# Patient Record
Sex: FEMALE | Race: WHITE | NOT HISPANIC OR LATINO | Employment: OTHER | URBAN - NONMETROPOLITAN AREA
[De-identification: names, ages, dates, MRNs, and addresses within clinical notes are randomized per-mention and may not be internally consistent; named-entity substitution may affect disease eponyms.]

---

## 2024-01-26 PROBLEM — I27.20 PULMONARY HYPERTENSION (MULTI): Status: ACTIVE | Noted: 2024-01-26

## 2024-01-26 PROBLEM — R06.02 SHORTNESS OF BREATH: Status: ACTIVE | Noted: 2024-01-26

## 2024-01-26 PROBLEM — R00.1 SINUS BRADYCARDIA: Status: ACTIVE | Noted: 2024-01-26

## 2024-01-26 PROBLEM — I10 ESSENTIAL HYPERTENSION: Status: ACTIVE | Noted: 2024-01-26

## 2024-01-26 PROBLEM — Q21.0 VENTRICULAR SEPTAL DEFECT (HHS-HCC): Status: ACTIVE | Noted: 2024-01-26

## 2024-01-26 PROBLEM — I48.19 PERSISTENT ATRIAL FIBRILLATION (MULTI): Status: ACTIVE | Noted: 2024-01-26

## 2024-01-26 PROBLEM — K74.60 LIVER CIRRHOSIS (MULTI): Status: ACTIVE | Noted: 2024-01-26

## 2024-01-26 PROBLEM — E78.5 HYPERLIPIDEMIA: Status: ACTIVE | Noted: 2024-01-26

## 2024-01-26 RX ORDER — CALCIUM CARBONATE 300MG(750)
400 TABLET,CHEWABLE ORAL DAILY
COMMUNITY

## 2024-01-26 RX ORDER — ATORVASTATIN CALCIUM 10 MG/1
10 TABLET, FILM COATED ORAL DAILY
COMMUNITY
Start: 2023-04-10

## 2024-01-26 RX ORDER — OXYCODONE AND ACETAMINOPHEN 5; 325 MG/1; MG/1
1 TABLET ORAL DAILY PRN
COMMUNITY
End: 2024-03-20 | Stop reason: ALTCHOICE

## 2024-01-26 RX ORDER — SPIRONOLACTONE 100 MG/1
50 TABLET, FILM COATED ORAL DAILY
COMMUNITY
Start: 2021-09-01 | End: 2024-03-20 | Stop reason: DRUGHIGH

## 2024-01-26 RX ORDER — LANOLIN ALCOHOL/MO/W.PET/CERES
1000 CREAM (GRAM) TOPICAL DAILY
COMMUNITY

## 2024-01-26 RX ORDER — CALCIUM CARBONATE/VITAMIN D3 600MG-5MCG
2 TABLET ORAL DAILY
COMMUNITY

## 2024-01-26 RX ORDER — FERROUS SULFATE 325(65) MG
325 TABLET ORAL
COMMUNITY

## 2024-01-26 RX ORDER — METOPROLOL TARTRATE 25 MG/1
1 TABLET, FILM COATED ORAL 2 TIMES DAILY
COMMUNITY
Start: 2021-03-31 | End: 2024-04-15

## 2024-01-26 RX ORDER — ASPIRIN 81 MG/1
1 TABLET ORAL DAILY
COMMUNITY
Start: 2021-03-31

## 2024-01-26 RX ORDER — SERTRALINE HYDROCHLORIDE 50 MG/1
1 TABLET, FILM COATED ORAL DAILY
COMMUNITY
Start: 2021-10-17

## 2024-01-26 RX ORDER — VITAMIN E MIXED 400 UNIT
200 CAPSULE ORAL DAILY
COMMUNITY

## 2024-01-26 RX ORDER — MULTIVIT-MIN/IRON/FOLIC ACID/K 18-600-40
1 CAPSULE ORAL DAILY
COMMUNITY

## 2024-03-20 ENCOUNTER — OFFICE VISIT (OUTPATIENT)
Dept: CARDIOLOGY | Facility: CLINIC | Age: 84
End: 2024-03-20
Payer: MEDICARE

## 2024-03-20 VITALS
HEART RATE: 67 BPM | SYSTOLIC BLOOD PRESSURE: 136 MMHG | WEIGHT: 159 LBS | DIASTOLIC BLOOD PRESSURE: 84 MMHG | HEIGHT: 66 IN | BODY MASS INDEX: 25.55 KG/M2

## 2024-03-20 DIAGNOSIS — Z87.891 FORMER SMOKER: ICD-10-CM

## 2024-03-20 DIAGNOSIS — E78.2 MIXED HYPERLIPIDEMIA: ICD-10-CM

## 2024-03-20 DIAGNOSIS — K74.60 HEPATIC CIRRHOSIS, UNSPECIFIED HEPATIC CIRRHOSIS TYPE, UNSPECIFIED WHETHER ASCITES PRESENT (MULTI): ICD-10-CM

## 2024-03-20 DIAGNOSIS — Q21.0 VENTRICULAR SEPTAL DEFECT (HHS-HCC): ICD-10-CM

## 2024-03-20 DIAGNOSIS — I48.19 PERSISTENT ATRIAL FIBRILLATION (MULTI): Primary | ICD-10-CM

## 2024-03-20 DIAGNOSIS — I27.20 PULMONARY HYPERTENSION (MULTI): ICD-10-CM

## 2024-03-20 DIAGNOSIS — R00.1 SINUS BRADYCARDIA: ICD-10-CM

## 2024-03-20 DIAGNOSIS — I10 ESSENTIAL HYPERTENSION: ICD-10-CM

## 2024-03-20 PROCEDURE — 93000 ELECTROCARDIOGRAM COMPLETE: CPT | Performed by: INTERNAL MEDICINE

## 2024-03-20 PROCEDURE — 3075F SYST BP GE 130 - 139MM HG: CPT | Performed by: INTERNAL MEDICINE

## 2024-03-20 PROCEDURE — 99214 OFFICE O/P EST MOD 30 MIN: CPT | Performed by: INTERNAL MEDICINE

## 2024-03-20 PROCEDURE — 1160F RVW MEDS BY RX/DR IN RCRD: CPT | Performed by: INTERNAL MEDICINE

## 2024-03-20 PROCEDURE — 1159F MED LIST DOCD IN RCRD: CPT | Performed by: INTERNAL MEDICINE

## 2024-03-20 PROCEDURE — 3079F DIAST BP 80-89 MM HG: CPT | Performed by: INTERNAL MEDICINE

## 2024-03-20 RX ORDER — ASCORBIC ACID 1000 MG
175 TABLET ORAL DAILY
COMMUNITY

## 2024-03-20 RX ORDER — HYDROCODONE BITARTRATE AND ACETAMINOPHEN 5; 325 MG/1; MG/1
1 TABLET ORAL 2 TIMES DAILY
COMMUNITY

## 2024-03-20 RX ORDER — SPIRONOLACTONE 50 MG/1
50 TABLET, FILM COATED ORAL DAILY
COMMUNITY

## 2024-03-20 NOTE — LETTER
March 20, 2024     Francesco Aguila MD  [address]  [facility] [id number]    Patient: Billie Hernández   YOB: 1940   Date of Visit: 3/20/2024       Dear Dr. Francesco Aguila MD:    Thank you for referring Billie Hernández to me for evaluation. Below are my notes for this consultation.  If you have questions, please do not hesitate to call me. I look forward to following your patient along with you.       Sincerely,     Brad Toth MD      CC: No Recipients  ______________________________________________________________________________________    Subjective   Billie Hernández is a 83 y.o. female       Chief Complaint    Follow-up          HPI       Patient is here for follow-up continue management for persistent atrial fibrillation with several workups in the past last time was few years back, history of liver cirrhosis, intolerance to anticoagulation and known small ventricular septal defect.  Since last time I saw her she reports stable cardiac status.  She denies complaint of chest pain, palpitation, lightheadedness, dizziness or syncope.  She remained in normal sinus rhythm.  There has been no change in cardiac status or symptoms.  She was referred to Dr. Khan in the past but decision was made to defer Watchman device  ASSESSMENT:      1. Persistent atrial fibrillation with prior recurrence following previous left shoulder surgery was placed back on amiodarone at the [facility]. Currently in sinus rhythm. we stopped amiodarone few years back because of concern about liver cirrhosis and liver disease. She remains in sinus rhythm but she has not had any recurrence  2. liver cirrhosis. With esophageal greases and GI bleed  3. Intolerance to anticoagulation due to GI bleed was evaluated recently for watchman device decision was made to defer for the time being Dr. Khan  4. Known small ventriculoseptal defect. Change based on recent echo  5. Obesity. With minor weight gain but no edema  6.  "Former smoker.  7. Hypertension. Controlled  8.Anemia requiring repeat blood transfusion unable to tolerate anticoagulation. Last check appears to be stable  9. Hyperlipidemia does not meet the criteria for treatment  10. Volume overload due to liver cirrhosis improved back to her baseline. She followed by the renal service     RECOMMENDATION:      1. I reviewed treatment option with the patient treatment option at great length. I recommended continue with observant approach. If she had any recurrence we will consider placing her on sotalol or Tikosyn  2. The chances of recurrence of her atrial fibrillation were discussed.  3. I reviewed treatment option and anticoagulation. Patient was evaluated for watchman device and decision was made to defer for now  4.. The complexity of her cardiac status discussed with her at length she understood and agreed  5. I reviewed with her her recent lab work, and echocardiogram  6. I will see her back in the office in 6 months with an EKG     Review of Systems   All other systems reviewed and are negative.           Vitals:    03/20/24 1411   BP: 136/84   BP Location: Left arm   Patient Position: Sitting   Pulse: 67   Weight: 72.1 kg (159 lb)   Height: 1.676 m (5' 6\")        EKG done in office today     Objective   Physical Exam  Constitutional:       Appearance: Normal appearance.   HENT:      Nose: Nose normal.   Neck:      Vascular: No carotid bruit.   Cardiovascular:      Rate and Rhythm: Normal rate.      Pulses: Normal pulses.      Heart sounds: Normal heart sounds.   Pulmonary:      Effort: Pulmonary effort is normal.   Abdominal:      General: Bowel sounds are normal.      Palpations: Abdomen is soft.   Musculoskeletal:         General: Normal range of motion.      Cervical back: Normal range of motion.      Right lower leg: No edema.      Left lower leg: No edema.   Skin:     General: Skin is warm and dry.   Neurological:      General: No focal deficit present.      Mental " Status: She is alert.   Psychiatric:         Mood and Affect: Mood normal.         Behavior: Behavior normal.         Thought Content: Thought content normal.         Judgment: Judgment normal.         Allergies  Sulfamethoxazole-trimethoprim     Current Medications    Current Outpatient Medications:   •  ascorbic acid, vitamin C, 500 mg capsule, Take 1 capsule by mouth once daily., Disp: , Rfl:   •  aspirin 81 mg EC tablet, Take 1 tablet (81 mg) by mouth once daily., Disp: , Rfl:   •  atorvastatin (Lipitor) 10 mg tablet, Take 1 tablet (10 mg) by mouth once daily., Disp: , Rfl:   •  calcium carbonate-vitamin D3 600 mg-5 mcg (200 unit) tablet, Take 2 tablets by mouth once daily., Disp: , Rfl:   •  cyanocobalamin (Vitamin B-12) 1,000 mcg tablet, Take 1 tablet (1,000 mcg) by mouth once daily., Disp: , Rfl:   •  ferrous sulfate, 325 mg ferrous sulfate, (iron) tablet, Take 1 tablet by mouth once daily with breakfast., Disp: , Rfl:   •  HYDROcodone-acetaminophen (Norco) 5-325 mg tablet, Take 1 tablet by mouth 2 times a day., Disp: , Rfl:   •  magnesium oxide (Mag-Ox) 400 mg tablet, Take 1 tablet (400 mg) by mouth once daily., Disp: , Rfl:   •  metoprolol tartrate (Lopressor) 25 mg tablet, Take 1 tablet (25 mg) by mouth 2 times a day., Disp: , Rfl:   •  milk thistle 175 mg tablet, Take 1 tablet (175 mg) by mouth once daily., Disp: , Rfl:   •  sertraline (Zoloft) 50 mg tablet, Take 1 tablet (50 mg) by mouth once daily., Disp: , Rfl:   •  spironolactone (Aldactone) 50 mg tablet, Take 1 tablet (50 mg) by mouth once daily., Disp: , Rfl:   •  vitamin E 90 mg (200 unit) capsule, Take 1 capsule (200 Units) by mouth once daily., Disp: , Rfl:                      Assessment/Plan   1. Persistent atrial fibrillation (CMS/HCC)  Follow Up In Cardiology    ECG 12 Lead      2. Essential hypertension        3. Mixed hyperlipidemia        4. Sinus bradycardia        5. Pulmonary hypertension (CMS/HCC)        6. Ventricular septal defect         7. Hepatic cirrhosis, unspecified hepatic cirrhosis type, unspecified whether ascites present (CMS/HCC)        8. BMI 25.0-25.9,adult        9. Former smoker                 Scribe Attestation  By signing my name below, I, Kadeem Villanueva LPN   attest that this documentation has been prepared under the direction and in the presence of Brad Toth MD.     Provider Attestation - Scribe documentation    All medical record entries made by the Scribe were at my direction and personally dictated by me. I have reviewed the chart and agree that the record accurately reflects my personal performance of the history, physical exam, discussion and plan.

## 2024-03-20 NOTE — PROGRESS NOTES
Subjective   Billie Hernández is a 83 y.o. female       Chief Complaint    Follow-up          HPI       Patient is here for follow-up continue management for persistent atrial fibrillation with several workups in the past last time was few years back, history of liver cirrhosis, intolerance to anticoagulation and known small ventricular septal defect.  Since last time I saw her she reports stable cardiac status.  She denies complaint of chest pain, palpitation, lightheadedness, dizziness or syncope.  She remained in normal sinus rhythm.  There has been no change in cardiac status or symptoms.  She was referred to Dr. Khan in the past but decision was made to defer Watchman device  ASSESSMENT:      1. Persistent atrial fibrillation with prior recurrence following previous left shoulder surgery was placed back on amiodarone at the [facility]. Currently in sinus rhythm. we stopped amiodarone few years back because of concern about liver cirrhosis and liver disease. She remains in sinus rhythm but she has not had any recurrence  2. liver cirrhosis. With esophageal greases and GI bleed  3. Intolerance to anticoagulation due to GI bleed was evaluated recently for watchman device decision was made to defer for the time being Dr. Khan  4. Known small ventriculoseptal defect. Change based on recent echo  5. Obesity. With minor weight gain but no edema  6. Former smoker.  7. Hypertension. Controlled  8.Anemia requiring repeat blood transfusion unable to tolerate anticoagulation. Last check appears to be stable  9. Hyperlipidemia does not meet the criteria for treatment  10. Volume overload due to liver cirrhosis improved back to her baseline. She followed by the renal service     RECOMMENDATION:      1. I reviewed treatment option with the patient treatment option at great length. I recommended continue with observant approach. If she had any recurrence we will consider placing her on sotalol or Tikosyn  2. The chances of  "recurrence of her atrial fibrillation were discussed.  3. I reviewed treatment option and anticoagulation. Patient was evaluated for watchman device and decision was made to defer for now  4.. The complexity of her cardiac status discussed with her at length she understood and agreed  5. I reviewed with her her recent lab work, and echocardiogram  6. I will see her back in the office in 6 months with an EKG     Review of Systems   All other systems reviewed and are negative.           Vitals:    03/20/24 1411   BP: 136/84   BP Location: Left arm   Patient Position: Sitting   Pulse: 67   Weight: 72.1 kg (159 lb)   Height: 1.676 m (5' 6\")        EKG done in office today     Objective   Physical Exam  Constitutional:       Appearance: Normal appearance.   HENT:      Nose: Nose normal.   Neck:      Vascular: No carotid bruit.   Cardiovascular:      Rate and Rhythm: Normal rate.      Pulses: Normal pulses.      Heart sounds: Normal heart sounds.   Pulmonary:      Effort: Pulmonary effort is normal.   Abdominal:      General: Bowel sounds are normal.      Palpations: Abdomen is soft.   Musculoskeletal:         General: Normal range of motion.      Cervical back: Normal range of motion.      Right lower leg: No edema.      Left lower leg: No edema.   Skin:     General: Skin is warm and dry.   Neurological:      General: No focal deficit present.      Mental Status: She is alert.   Psychiatric:         Mood and Affect: Mood normal.         Behavior: Behavior normal.         Thought Content: Thought content normal.         Judgment: Judgment normal.         Allergies  Sulfamethoxazole-trimethoprim     Current Medications    Current Outpatient Medications:     ascorbic acid, vitamin C, 500 mg capsule, Take 1 capsule by mouth once daily., Disp: , Rfl:     aspirin 81 mg EC tablet, Take 1 tablet (81 mg) by mouth once daily., Disp: , Rfl:     atorvastatin (Lipitor) 10 mg tablet, Take 1 tablet (10 mg) by mouth once daily., Disp: , " Rfl:     calcium carbonate-vitamin D3 600 mg-5 mcg (200 unit) tablet, Take 2 tablets by mouth once daily., Disp: , Rfl:     cyanocobalamin (Vitamin B-12) 1,000 mcg tablet, Take 1 tablet (1,000 mcg) by mouth once daily., Disp: , Rfl:     ferrous sulfate, 325 mg ferrous sulfate, (iron) tablet, Take 1 tablet by mouth once daily with breakfast., Disp: , Rfl:     HYDROcodone-acetaminophen (Norco) 5-325 mg tablet, Take 1 tablet by mouth 2 times a day., Disp: , Rfl:     magnesium oxide (Mag-Ox) 400 mg tablet, Take 1 tablet (400 mg) by mouth once daily., Disp: , Rfl:     metoprolol tartrate (Lopressor) 25 mg tablet, Take 1 tablet (25 mg) by mouth 2 times a day., Disp: , Rfl:     milk thistle 175 mg tablet, Take 1 tablet (175 mg) by mouth once daily., Disp: , Rfl:     sertraline (Zoloft) 50 mg tablet, Take 1 tablet (50 mg) by mouth once daily., Disp: , Rfl:     spironolactone (Aldactone) 50 mg tablet, Take 1 tablet (50 mg) by mouth once daily., Disp: , Rfl:     vitamin E 90 mg (200 unit) capsule, Take 1 capsule (200 Units) by mouth once daily., Disp: , Rfl:                      Assessment/Plan   1. Persistent atrial fibrillation (CMS/HCC)  Follow Up In Cardiology    ECG 12 Lead      2. Essential hypertension        3. Mixed hyperlipidemia        4. Sinus bradycardia        5. Pulmonary hypertension (CMS/HCC)        6. Ventricular septal defect        7. Hepatic cirrhosis, unspecified hepatic cirrhosis type, unspecified whether ascites present (CMS/HCC)        8. BMI 25.0-25.9,adult        9. Former smoker                 Scribe Attestation  By signing my name below, I, Kadeem Villanueva LPN   attest that this documentation has been prepared under the direction and in the presence of Brad Toth MD.     Provider Attestation - Scribe documentation    All medical record entries made by the Scribe were at my direction and personally dictated by me. I have reviewed the chart and agree that the record accurately  reflects my personal performance of the history, physical exam, discussion and plan.

## 2024-03-20 NOTE — PATIENT INSTRUCTIONS
Please bring all medicines, vitamins, and herbal supplements with you when you come to the office.    Prescriptions will not be filled unless you are compliant with your follow up appointments or have a follow up appointment scheduled as per instruction of your physician. Refills should be requested at the time of your visit.    BMI was above normal measurement. Current weight: 72.1 kg (159 lb)  Weight change since last visit (-) denotes wt loss -6 lbs   Weight loss needed to achieve BMI 25: 4.4 Lbs  Weight loss needed to achieve BMI 30: -26.5 Lbs  Provided instructions on dietary changes  Provided instructions on exercise.

## 2024-04-15 DIAGNOSIS — I48.19 PERSISTENT ATRIAL FIBRILLATION (MULTI): ICD-10-CM

## 2024-04-15 DIAGNOSIS — I10 ESSENTIAL HYPERTENSION: ICD-10-CM

## 2024-04-15 RX ORDER — METOPROLOL TARTRATE 25 MG/1
25 TABLET, FILM COATED ORAL 2 TIMES DAILY
Qty: 180 TABLET | Refills: 3 | Status: SHIPPED | OUTPATIENT
Start: 2024-04-15

## 2024-06-07 ENCOUNTER — TELEPHONE (OUTPATIENT)
Dept: CARDIOLOGY | Facility: CLINIC | Age: 84
End: 2024-06-07
Payer: MEDICARE

## 2024-06-07 NOTE — TELEPHONE ENCOUNTER
----- Message from Brad Toth MD sent at 6/6/2024  5:05 PM EDT -----  Overall patient is acceptable to proceed with surgery.  She is at increased risk for atrial fibrillation but this is not prohibitive.  Patient can proceed  ----- Message -----  From: Madeline Micha  Sent: 6/6/2024   3:41 PM EDT  To: MD Brad Morelos MD    [place] [id number] Clinical Support Staff  Patient overall is acceptable surgical risk.  She can proceed.  She is at increased risk of atrial fibrillation.  Okay to hold aspirin for 5 5 to 7 days prior to procedure

## 2024-06-14 ENCOUNTER — TELEPHONE (OUTPATIENT)
Dept: CARDIOLOGY | Facility: CLINIC | Age: 84
End: 2024-06-14

## 2024-06-14 DIAGNOSIS — I48.91 ATRIAL FIBRILLATION, UNSPECIFIED TYPE (MULTI): ICD-10-CM

## 2024-06-14 NOTE — TELEPHONE ENCOUNTER
Patient was seen on consult at [facility] on 6/12/2024; Reason for Consult:   Atrial fibrillation with a rapid ventricular response. At discharge need 1 week EKG for QTC prolongation and then follow up in 2 months. Patient started on Tikoysn 125mcg twice daily.

## 2024-06-21 ENCOUNTER — ANCILLARY PROCEDURE (OUTPATIENT)
Dept: CARDIOLOGY | Facility: CLINIC | Age: 84
End: 2024-06-21
Payer: MEDICARE

## 2024-06-21 VITALS
WEIGHT: 146.6 LBS | HEART RATE: 61 BPM | BODY MASS INDEX: 23.56 KG/M2 | HEIGHT: 66 IN | DIASTOLIC BLOOD PRESSURE: 62 MMHG | SYSTOLIC BLOOD PRESSURE: 106 MMHG

## 2024-06-21 RX ORDER — DOFETILIDE 0.12 MG/1
125 CAPSULE ORAL EVERY 12 HOURS
COMMUNITY

## 2024-06-21 NOTE — PROGRESS NOTES
"Patient is here for an EKG visit ordered by Dr. Toth due to atrial fibrillation. Dr. Jo is in suite. Patient is here due to initiation of Tikosyn 125mg BID and prolonged QT interval. Medication list Updated verbally with patient in office. Denies any current cardiac complaints. Discussed with Krystle Noriega RN prior to discharge.     To Dr. Toth for review.         Vitals:    06/21/24 1403   BP: 106/62   BP Location: Left arm   Patient Position: Sitting   Pulse: 61   Weight: 66.5 kg (146 lb 9.6 oz)   Height: 1.676 m (5' 6\")       "

## 2024-07-08 DIAGNOSIS — I48.19 PERSISTENT ATRIAL FIBRILLATION (MULTI): ICD-10-CM

## 2024-07-08 RX ORDER — DOFETILIDE 0.12 MG/1
125 CAPSULE ORAL EVERY 12 HOURS
Qty: 180 CAPSULE | Refills: 3 | Status: SHIPPED | OUTPATIENT
Start: 2024-07-08 | End: 2025-07-08

## 2024-07-15 PROCEDURE — 93306 TTE W/DOPPLER COMPLETE: CPT | Performed by: INTERNAL MEDICINE

## 2024-09-25 ENCOUNTER — APPOINTMENT (OUTPATIENT)
Dept: CARDIOLOGY | Facility: CLINIC | Age: 84
End: 2024-09-25
Payer: MEDICARE

## 2024-09-25 VITALS
SYSTOLIC BLOOD PRESSURE: 104 MMHG | BODY MASS INDEX: 22.5 KG/M2 | WEIGHT: 140 LBS | DIASTOLIC BLOOD PRESSURE: 60 MMHG | HEIGHT: 66 IN | HEART RATE: 76 BPM

## 2024-09-25 DIAGNOSIS — E78.2 MIXED HYPERLIPIDEMIA: ICD-10-CM

## 2024-09-25 DIAGNOSIS — K74.60 HEPATIC CIRRHOSIS, UNSPECIFIED HEPATIC CIRRHOSIS TYPE, UNSPECIFIED WHETHER ASCITES PRESENT (MULTI): ICD-10-CM

## 2024-09-25 DIAGNOSIS — R06.02 SHORTNESS OF BREATH: ICD-10-CM

## 2024-09-25 DIAGNOSIS — I48.91 ATRIAL FIBRILLATION, UNSPECIFIED TYPE (MULTI): Primary | ICD-10-CM

## 2024-09-25 DIAGNOSIS — I48.19 PERSISTENT ATRIAL FIBRILLATION (MULTI): ICD-10-CM

## 2024-09-25 DIAGNOSIS — R00.1 SINUS BRADYCARDIA: ICD-10-CM

## 2024-09-25 DIAGNOSIS — I10 ESSENTIAL HYPERTENSION: ICD-10-CM

## 2024-09-25 DIAGNOSIS — I27.20 PULMONARY HYPERTENSION (MULTI): ICD-10-CM

## 2024-09-25 PROCEDURE — 1036F TOBACCO NON-USER: CPT | Performed by: INTERNAL MEDICINE

## 2024-09-25 PROCEDURE — 1160F RVW MEDS BY RX/DR IN RCRD: CPT | Performed by: INTERNAL MEDICINE

## 2024-09-25 PROCEDURE — 1159F MED LIST DOCD IN RCRD: CPT | Performed by: INTERNAL MEDICINE

## 2024-09-25 PROCEDURE — 3074F SYST BP LT 130 MM HG: CPT | Performed by: INTERNAL MEDICINE

## 2024-09-25 PROCEDURE — 3078F DIAST BP <80 MM HG: CPT | Performed by: INTERNAL MEDICINE

## 2024-09-25 PROCEDURE — 99214 OFFICE O/P EST MOD 30 MIN: CPT | Performed by: INTERNAL MEDICINE

## 2024-09-25 PROCEDURE — 93000 ELECTROCARDIOGRAM COMPLETE: CPT | Performed by: INTERNAL MEDICINE

## 2024-09-25 RX ORDER — FOLIC ACID 1 MG/1
1 TABLET ORAL
COMMUNITY
Start: 2024-06-11

## 2024-09-25 RX ORDER — DILTIAZEM HYDROCHLORIDE 120 MG/1
120 CAPSULE, COATED, EXTENDED RELEASE ORAL DAILY
Qty: 90 CAPSULE | Refills: 3 | Status: SHIPPED | OUTPATIENT
Start: 2024-09-25 | End: 2025-09-25

## 2024-09-25 RX ORDER — DILTIAZEM HYDROCHLORIDE 120 MG/1
120 CAPSULE, COATED, EXTENDED RELEASE ORAL DAILY
COMMUNITY
Start: 2024-09-03 | End: 2024-09-25 | Stop reason: SDUPTHER

## 2024-09-25 RX ORDER — NYSTATIN 100000 [USP'U]/ML
5 SUSPENSION ORAL 4 TIMES DAILY
COMMUNITY
Start: 2024-08-16

## 2024-09-25 RX ORDER — PSYLLIUM HUSK 0.4 G
1 CAPSULE ORAL DAILY
COMMUNITY

## 2024-09-25 RX ORDER — PANTOPRAZOLE SODIUM 40 MG/1
40 TABLET, DELAYED RELEASE ORAL DAILY
COMMUNITY
Start: 2024-07-17

## 2024-09-25 RX ORDER — CHOLECALCIFEROL (VITAMIN D3) 125 MCG
5000 CAPSULE ORAL
COMMUNITY

## 2024-09-25 RX ORDER — DOXYCYCLINE HYCLATE 100 MG/1
100 TABLET, DELAYED RELEASE ORAL 2 TIMES DAILY
COMMUNITY

## 2024-09-25 RX ORDER — ACETAMINOPHEN 325 MG/1
325 TABLET ORAL EVERY 6 HOURS PRN
COMMUNITY
Start: 2024-07-16

## 2024-09-25 RX ORDER — DOFETILIDE 0.12 MG/1
125 CAPSULE ORAL EVERY 12 HOURS
Qty: 180 CAPSULE | Refills: 3 | Status: SHIPPED | OUTPATIENT
Start: 2024-09-25 | End: 2025-09-25

## 2024-09-25 NOTE — PROGRESS NOTES
Subjective   Billie Hernández is a 84 y.o. female       Chief Complaint    Follow-up          HPI        Patient is here for follow-up continue management for persistent atrial fibrillation.  She was recently in the hospital and was placed on dofetilide.  Since then she reported improvement of her symptoms.  She denies chest pain, lightheadedness, dizziness or syncope.  I saw her in the hospital on 2 occasions for her A-fib and for volume management.  She did undergo recent lab all of it with noted and reviewed with her.    ASSESSMENT:      1. Persistent atrial fibrillation with prior recurrence following previous left shoulder surgery was placed back on amiodarone at the [facility]. Currently in sinus rhythm. we stopped amiodarone few years back because of concern about liver cirrhosis and liver disease. Sshe had recent recurrence and was placed on dofetilide currently in sinus rhythm.  Patient not candidate for anticoagulation due to liver cirrhosis  2. liver cirrhosis. With esophageal greases and GI bleed  3. Intolerance to anticoagulation due to GI bleed was evaluated recently for watchman device decision was made to defer for the time being Dr. Khan  4. Known small ventriculoseptal defect. Change based on recent echo  5. Obesity. With minor weight gain but no edema  6. Former smoker.  7. Hypertension. Controlled  8.Anemia requiring repeat blood transfusion unable to tolerate anticoagulation. Last check appears to be stable  9. Hyperlipidemia does not meet the criteria for treatment  10. Volume overload due to liver cirrhosis improved back to her baseline. She followed by the renal service     RECOMMENDATION:      1. I advised the patient to continue present medical regimen  2. The chances of recurrence of her atrial fibrillation were discussed.  And treatment option reviewed with her and her daughter  3. I reviewed treatment option and anticoagulation. Patient was evaluated for watchman device and decision  "was made to defer for now  4.. The complexity of her cardiac status discussed with her at length she understood and agreed  5. I reviewed with her her recent lab work, and hospitalization record.  Her potassium and sodium are normal  6. I will see her back in the office in 6 months with an EKG     Review of Systems   Constitutional: Positive for malaise/fatigue.   All other systems reviewed and are negative.           Vitals:    09/25/24 1429   BP: 104/60   BP Location: Right arm   Patient Position: Sitting   Pulse: 76   Weight: 63.5 kg (140 lb)   Height: 1.676 m (5' 6\")      EKG done in office today      Objective   Physical Exam  Constitutional:       Appearance: Normal appearance.   HENT:      Nose: Nose normal.   Neck:      Vascular: No carotid bruit.   Cardiovascular:      Rate and Rhythm: Normal rate.      Pulses: Normal pulses.      Heart sounds: Normal heart sounds.   Pulmonary:      Effort: Pulmonary effort is normal.   Abdominal:      General: Bowel sounds are normal.      Palpations: Abdomen is soft.   Musculoskeletal:         General: Normal range of motion.      Cervical back: Normal range of motion.      Right lower leg: No edema.      Left lower leg: No edema.   Skin:     General: Skin is warm and dry.   Neurological:      General: No focal deficit present.      Mental Status: She is alert.   Psychiatric:         Mood and Affect: Mood normal.         Behavior: Behavior normal.         Thought Content: Thought content normal.         Judgment: Judgment normal.         Allergies  Sulfamethoxazole-trimethoprim     Current Medications    Current Outpatient Medications:     acetaminophen (Tylenol) 325 mg tablet, Take 1 tablet (325 mg) by mouth every 6 hours if needed., Disp: , Rfl:     ascorbic acid, vitamin C, 500 mg capsule, Take 2 capsules by mouth once daily., Disp: , Rfl:     aspirin 81 mg EC tablet, Take 1 tablet (81 mg) by mouth 2 times a day., Disp: , Rfl:     atorvastatin (Lipitor) 10 mg tablet, " Take 1 tablet (10 mg) by mouth once daily., Disp: , Rfl:     calcium carbonate-vitamin D3 600 mg-5 mcg (200 unit) tablet, Take 2 tablets by mouth once daily., Disp: , Rfl:     cholecalciferol (Vitamin D-3) 125 MCG (5000 UT) capsule, Take 1 capsule (125 mcg) by mouth once daily., Disp: , Rfl:     cyanocobalamin (Vitamin B-12) 1,000 mcg tablet, Take 1 tablet (1,000 mcg) by mouth once daily., Disp: , Rfl:     dilTIAZem CD (Cardizem CD) 120 mg 24 hr capsule, 1 capsule (120 mg) once daily., Disp: , Rfl:     dofetilide (Tikosyn) 125 mcg capsule, Take 1 capsule (125 mcg) by mouth every 12 hours., Disp: 180 capsule, Rfl: 3    doxycycline (Doryx) 100 mg EC tablet, Take 1 tablet (100 mg) by mouth twice a day., Disp: , Rfl:     ferrous sulfate, 325 mg ferrous sulfate, (iron) tablet, Take 1 tablet (325 mg) by mouth once daily with breakfast., Disp: , Rfl:     folic acid (Folvite) 1 mg tablet, Take 1 tablet (1 mg) by mouth once daily., Disp: , Rfl:     HYDROcodone-acetaminophen (Norco) 5-325 mg tablet, Take 1 tablet by mouth 2 times a day., Disp: , Rfl:     magnesium oxide (Mag-Ox) 400 mg tablet, Take 1 tablet (400 mg) by mouth once daily., Disp: , Rfl:     nystatin (Mycostatin) 100,000 unit/mL suspension, 5 mL (500,000 Units) 4 times a day., Disp: , Rfl:     pantoprazole (ProtoNix) 40 mg EC tablet, Take 1 tablet (40 mg) by mouth early in the morning.., Disp: , Rfl:     psyllium (Metamucil) 0.4 gram capsule, Take 1 capsule by mouth early in the morning.., Disp: , Rfl:     sertraline (Zoloft) 50 mg tablet, Take 1 tablet (50 mg) by mouth once daily., Disp: , Rfl:     spironolactone (Aldactone) 50 mg tablet, Take 0.5 tablets (25 mg) by mouth once daily., Disp: , Rfl:     metoprolol tartrate (Lopressor) 25 mg tablet, TAKE 1 TABLET BY MOUTH TWICE  DAILY, Disp: 180 tablet, Rfl: 3                     Assessment/Plan   1. Atrial fibrillation, unspecified type (Multi)  Follow Up In Cardiology      2. Mixed hyperlipidemia        3. Sinus  bradycardia        4. Pulmonary hypertension (Multi)        5. Persistent atrial fibrillation (Multi)        6. Essential hypertension        7. BMI 25.0-25.9,adult        8. Hepatic cirrhosis, unspecified hepatic cirrhosis type, unspecified whether ascites present (Multi)        9. Shortness of breath                 Scribe Attestation  By signing my name below, Loretta RUST LPN, Scribe   attest that this documentation has been prepared under the direction and in the presence of Brad Toth MD.     Provider Attestation - Scribe documentation    All medical record entries made by the Scribe were at my direction and personally dictated by me. I have reviewed the chart and agree that the record accurately reflects my personal performance of the history, physical exam, discussion and plan.

## 2024-09-25 NOTE — PATIENT INSTRUCTIONS
Please bring all medicines, vitamins, and herbal supplements with you when you come to the office.    Prescriptions will not be filled unless you are compliant with your follow up appointments or have a follow up appointment scheduled as per instruction of your physician. Refills should be requested at the time of your visit.     Fall Prevention Education Given

## 2024-09-25 NOTE — LETTER
September 25, 2024     Francesco Aguila MD  [address]  [facility] [address]    Patient: Billie Hernández   YOB: 1940   Date of Visit: 9/25/2024       Dear Dr. Francesco Aguila MD:    Thank you for referring Billie Hernández to me for evaluation. Below are my notes for this consultation.  If you have questions, please do not hesitate to call me. I look forward to following your patient along with you.       Sincerely,     Brad Toth MD      CC: No Recipients  ______________________________________________________________________________________    Subjective   Billie Hernández is a 84 y.o. female       Chief Complaint    Follow-up          HPI        Patient is here for follow-up continue management for persistent atrial fibrillation.  She was recently in the hospital and was placed on dofetilide.  Since then she reported improvement of her symptoms.  She denies chest pain, lightheadedness, dizziness or syncope.  I saw her in the hospital on 2 occasions for her A-fib and for volume management.  She did undergo recent lab all of it with noted and reviewed with her.    ASSESSMENT:      1. Persistent atrial fibrillation with prior recurrence following previous left shoulder surgery was placed back on amiodarone at the [facility]. Currently in sinus rhythm. we stopped amiodarone few years back because of concern about liver cirrhosis and liver disease. Sshe had recent recurrence and was placed on dofetilide currently in sinus rhythm.  Patient not candidate for anticoagulation due to liver cirrhosis  2. liver cirrhosis. With esophageal greases and GI bleed  3. Intolerance to anticoagulation due to GI bleed was evaluated recently for watchman device decision was made to defer for the time being Dr. Khan  4. Known small ventriculoseptal defect. Change based on recent echo  5. Obesity. With minor weight gain but no edema  6. Former smoker.  7. Hypertension. Controlled  8.Anemia requiring repeat  "blood transfusion unable to tolerate anticoagulation. Last check appears to be stable  9. Hyperlipidemia does not meet the criteria for treatment  10. Volume overload due to liver cirrhosis improved back to her baseline. She followed by the renal service     RECOMMENDATION:      1. I advised the patient to continue present medical regimen  2. The chances of recurrence of her atrial fibrillation were discussed.  And treatment option reviewed with her and her daughter  3. I reviewed treatment option and anticoagulation. Patient was evaluated for watchman device and decision was made to defer for now  4.. The complexity of her cardiac status discussed with her at length she understood and agreed  5. I reviewed with her her recent lab work, and hospitalization record.  Her potassium and sodium are normal  6. I will see her back in the office in 6 months with an EKG     Review of Systems   Constitutional: Positive for malaise/fatigue.   All other systems reviewed and are negative.           Vitals:    09/25/24 1429   BP: 104/60   BP Location: Right arm   Patient Position: Sitting   Pulse: 76   Weight: 63.5 kg (140 lb)   Height: 1.676 m (5' 6\")      EKG done in office today      Objective   Physical Exam  Constitutional:       Appearance: Normal appearance.   HENT:      Nose: Nose normal.   Neck:      Vascular: No carotid bruit.   Cardiovascular:      Rate and Rhythm: Normal rate.      Pulses: Normal pulses.      Heart sounds: Normal heart sounds.   Pulmonary:      Effort: Pulmonary effort is normal.   Abdominal:      General: Bowel sounds are normal.      Palpations: Abdomen is soft.   Musculoskeletal:         General: Normal range of motion.      Cervical back: Normal range of motion.      Right lower leg: No edema.      Left lower leg: No edema.   Skin:     General: Skin is warm and dry.   Neurological:      General: No focal deficit present.      Mental Status: She is alert.   Psychiatric:         Mood and Affect: " Mood normal.         Behavior: Behavior normal.         Thought Content: Thought content normal.         Judgment: Judgment normal.         Allergies  Sulfamethoxazole-trimethoprim     Current Medications    Current Outpatient Medications:   •  acetaminophen (Tylenol) 325 mg tablet, Take 1 tablet (325 mg) by mouth every 6 hours if needed., Disp: , Rfl:   •  ascorbic acid, vitamin C, 500 mg capsule, Take 2 capsules by mouth once daily., Disp: , Rfl:   •  aspirin 81 mg EC tablet, Take 1 tablet (81 mg) by mouth 2 times a day., Disp: , Rfl:   •  atorvastatin (Lipitor) 10 mg tablet, Take 1 tablet (10 mg) by mouth once daily., Disp: , Rfl:   •  calcium carbonate-vitamin D3 600 mg-5 mcg (200 unit) tablet, Take 2 tablets by mouth once daily., Disp: , Rfl:   •  cholecalciferol (Vitamin D-3) 125 MCG (5000 UT) capsule, Take 1 capsule (125 mcg) by mouth once daily., Disp: , Rfl:   •  cyanocobalamin (Vitamin B-12) 1,000 mcg tablet, Take 1 tablet (1,000 mcg) by mouth once daily., Disp: , Rfl:   •  dilTIAZem CD (Cardizem CD) 120 mg 24 hr capsule, 1 capsule (120 mg) once daily., Disp: , Rfl:   •  dofetilide (Tikosyn) 125 mcg capsule, Take 1 capsule (125 mcg) by mouth every 12 hours., Disp: 180 capsule, Rfl: 3  •  doxycycline (Doryx) 100 mg EC tablet, Take 1 tablet (100 mg) by mouth twice a day., Disp: , Rfl:   •  ferrous sulfate, 325 mg ferrous sulfate, (iron) tablet, Take 1 tablet (325 mg) by mouth once daily with breakfast., Disp: , Rfl:   •  folic acid (Folvite) 1 mg tablet, Take 1 tablet (1 mg) by mouth once daily., Disp: , Rfl:   •  HYDROcodone-acetaminophen (Norco) 5-325 mg tablet, Take 1 tablet by mouth 2 times a day., Disp: , Rfl:   •  magnesium oxide (Mag-Ox) 400 mg tablet, Take 1 tablet (400 mg) by mouth once daily., Disp: , Rfl:   •  nystatin (Mycostatin) 100,000 unit/mL suspension, 5 mL (500,000 Units) 4 times a day., Disp: , Rfl:   •  pantoprazole (ProtoNix) 40 mg EC tablet, Take 1 tablet (40 mg) by mouth early in the  morning.., Disp: , Rfl:   •  psyllium (Metamucil) 0.4 gram capsule, Take 1 capsule by mouth early in the morning.., Disp: , Rfl:   •  sertraline (Zoloft) 50 mg tablet, Take 1 tablet (50 mg) by mouth once daily., Disp: , Rfl:   •  spironolactone (Aldactone) 50 mg tablet, Take 0.5 tablets (25 mg) by mouth once daily., Disp: , Rfl:   •  metoprolol tartrate (Lopressor) 25 mg tablet, TAKE 1 TABLET BY MOUTH TWICE  DAILY, Disp: 180 tablet, Rfl: 3                     Assessment/Plan   1. Atrial fibrillation, unspecified type (Multi)  Follow Up In Cardiology      2. Mixed hyperlipidemia        3. Sinus bradycardia        4. Pulmonary hypertension (Multi)        5. Persistent atrial fibrillation (Multi)        6. Essential hypertension        7. BMI 25.0-25.9,adult        8. Hepatic cirrhosis, unspecified hepatic cirrhosis type, unspecified whether ascites present (Multi)        9. Shortness of breath                 Scribe Attestation  By signing my name below, Loretta RUST LPN, Scribe   attest that this documentation has been prepared under the direction and in the presence of Brad Toth MD.     Provider Attestation - Scribe documentation    All medical record entries made by the Scribe were at my direction and personally dictated by me. I have reviewed the chart and agree that the record accurately reflects my personal performance of the history, physical exam, discussion and plan.

## 2024-10-08 DIAGNOSIS — I48.19 PERSISTENT ATRIAL FIBRILLATION (MULTI): ICD-10-CM

## 2024-10-08 NOTE — TELEPHONE ENCOUNTER
Patient called stating she is supposed to take three tablets of 125mcg of Tikosyn. I advised patient that from dc of hospital and recent OV he dosage is still 125mcg every 12 hours. Patient wants clarification directly from Dr. Brad Toth MD    To Dr. Brad Toth MD for review.

## 2024-10-08 NOTE — TELEPHONE ENCOUNTER
Patient phones back, informed her she should be taking 125 mcg of tikosyn bid. Patient states she is not taking 125 mcg bid she states she has been taking 375 mcg tikosyn bid as per the recommendation of Dr. Brad Toth MD when she was hospitalized she states Dr. Manuel Jo MD advised she take 4 pills and then Dr. Brad Toth MD recommended 3 pills.     Will check to see if there are further records from hospital following discharge on 7/22/24 per patient.

## 2024-10-09 RX ORDER — DOFETILIDE 0.12 MG/1
375 CAPSULE ORAL EVERY 12 HOURS
Qty: 540 CAPSULE | Refills: 3 | Status: SHIPPED | OUTPATIENT
Start: 2024-10-09 | End: 2025-10-09

## 2024-10-09 NOTE — TELEPHONE ENCOUNTER
[place] [facility] [place]. Records show patient was started on 375 mcg tikosyn bid and tikosyn 250 mcg bid was discontinued.     Please verify if patient should be taking 375 mcg tikosyn bid.

## 2024-10-09 NOTE — TELEPHONE ENCOUNTER
Patient updated on correct dosing of tikosyn. She verbalized understanding.     Patient requesting refill, order prepped and sent to Dr. Brad Toth MD for signature.

## 2025-02-14 DIAGNOSIS — I48.19 PERSISTENT ATRIAL FIBRILLATION (MULTI): ICD-10-CM

## 2025-02-14 RX ORDER — DOFETILIDE 0.12 MG/1
375 CAPSULE ORAL EVERY 12 HOURS
Qty: 15 CAPSULE | Refills: 0 | Status: CANCELLED | OUTPATIENT
Start: 2025-02-14 | End: 2025-02-17

## 2025-02-14 NOTE — TELEPHONE ENCOUNTER
Dr. Brad Toth MD patient, please sign short supply    If she is having difficulty breathing then her asthma is not well controlled and she needs a f/u appt. Please inform mom and schedule her for f/u to discuss asthma and PE

## 2025-03-26 ENCOUNTER — APPOINTMENT (OUTPATIENT)
Dept: CARDIOLOGY | Facility: CLINIC | Age: 85
End: 2025-03-26
Payer: MEDICARE

## 2025-03-26 VITALS
SYSTOLIC BLOOD PRESSURE: 134 MMHG | HEART RATE: 68 BPM | BODY MASS INDEX: 24.91 KG/M2 | DIASTOLIC BLOOD PRESSURE: 64 MMHG | WEIGHT: 155 LBS | HEIGHT: 66 IN

## 2025-03-26 DIAGNOSIS — Z87.891 FORMER SMOKER: ICD-10-CM

## 2025-03-26 DIAGNOSIS — K74.60 HEPATIC CIRRHOSIS, UNSPECIFIED HEPATIC CIRRHOSIS TYPE, UNSPECIFIED WHETHER ASCITES PRESENT (MULTI): ICD-10-CM

## 2025-03-26 DIAGNOSIS — I10 ESSENTIAL HYPERTENSION: ICD-10-CM

## 2025-03-26 DIAGNOSIS — E78.2 MIXED HYPERLIPIDEMIA: ICD-10-CM

## 2025-03-26 DIAGNOSIS — R00.1 SINUS BRADYCARDIA: ICD-10-CM

## 2025-03-26 DIAGNOSIS — I48.19 PERSISTENT ATRIAL FIBRILLATION (MULTI): Primary | ICD-10-CM

## 2025-03-26 DIAGNOSIS — Q21.0 VENTRICULAR SEPTAL DEFECT (HHS-HCC): ICD-10-CM

## 2025-03-26 PROCEDURE — 99214 OFFICE O/P EST MOD 30 MIN: CPT | Performed by: INTERNAL MEDICINE

## 2025-03-26 PROCEDURE — 93000 ELECTROCARDIOGRAM COMPLETE: CPT | Performed by: INTERNAL MEDICINE

## 2025-03-26 PROCEDURE — 1159F MED LIST DOCD IN RCRD: CPT | Performed by: INTERNAL MEDICINE

## 2025-03-26 PROCEDURE — G2211 COMPLEX E/M VISIT ADD ON: HCPCS | Performed by: INTERNAL MEDICINE

## 2025-03-26 PROCEDURE — 3075F SYST BP GE 130 - 139MM HG: CPT | Performed by: INTERNAL MEDICINE

## 2025-03-26 PROCEDURE — 1036F TOBACCO NON-USER: CPT | Performed by: INTERNAL MEDICINE

## 2025-03-26 PROCEDURE — 3078F DIAST BP <80 MM HG: CPT | Performed by: INTERNAL MEDICINE

## 2025-03-26 PROCEDURE — 1160F RVW MEDS BY RX/DR IN RCRD: CPT | Performed by: INTERNAL MEDICINE

## 2025-03-26 RX ORDER — FAMOTIDINE 10 MG/1
10 TABLET ORAL DAILY
COMMUNITY

## 2025-03-26 NOTE — PATIENT INSTRUCTIONS
Please bring all medicines, vitamins, and herbal supplements with you when you come to the office.    Prescriptions will not be filled unless you are compliant with your follow up appointments or have a follow up appointment scheduled as per instruction of your physician. Refills should be requested at the time of your visit.     Fall Prevention Education Given     BMI was above normal measurement. Current weight: 70.3 kg (155 lb)  Weight change since last visit (-) denotes wt loss 15 lbs   Weight loss needed to achieve BMI 25: 0.4 Lbs  Weight loss needed to achieve BMI 30: -30.5 Lbs  Provided instructions on dietary changes.    6 months  Same meds

## 2025-03-26 NOTE — PROGRESS NOTES
Chief Complaint   Patient presents with    Follow-up     Patient here for 6 month follow up for a-fib; states she was in a-fib 03.15.25, which lasted for about 10-15 minutes.        Subjective   Billie Hernández is a 84 y.o. female     HPI        Patient is here for follow-up to management for persistent atrial fibrillation maintaining sinus rhythm with dofetilide, history of liver cirrhosis and intolerance to anticoagulation and ventricular septal defect.  Since last time I saw her she reports 1 brief episode of atrial fibrillation lasted 15 minutes.  She denies chest pain, lightheadedness, dizziness or syncope.  Her recent lab work noted and reviewed with her.    ASSESSMENT:      1. Persistent atrial fibrillation with prior recurrence following previous left shoulder surgery was placed back on amiodarone at the [facility]. Currently in sinus rhythm. we stopped amiodarone few years back because of concern about liver cirrhosis and liver disease. Sshe had recent recurrence and was placed on dofetilide currently in sinus rhythm.  Patient not candidate for anticoagulation due to liver cirrhosis overall she had good control on her A-fib  2. liver cirrhosis. With esophageal greases and GI bleed  3. Intolerance to anticoagulation due to GI bleed was evaluated recently for watchman device decision was made to defer for the time being Dr. Khan  4. Known small ventriculoseptal defect. Change based on recent echo  5.  BMI down to 25  6. Former smoker.  7. Hypertension. Controlled  8.Anemia requiring repeat blood transfusion unable to tolerate anticoagulation. Last check appears to be stable  9. Hyperlipidemia does not meet the criteria for treatment  10. Volume overload due to liver cirrhosis improved back to her baseline. She followed by the renal service     RECOMMENDATION:      1. I advised the patient to continue present medical regimen  2. The chances of recurrence of her atrial fibrillation were discussed.  And  "treatment option reviewed with her and her daughter  3. I reviewed treatment option and anticoagulation. Patient was evaluated for watchman device and decision was made to defer for now  4.. The complexity of her cardiac status discussed with her at length she understood and agreed  5. I reviewed with her her recent lab work,  6. I will see her back in the office in 6 months with an EKG  Review of Systems   All other systems reviewed and are negative.       EKG done in office today      Vitals:    03/26/25 1443   BP: 134/64   BP Location: Left arm   Patient Position: Sitting   Pulse: 68   Weight: 70.3 kg (155 lb)   Height: 1.676 m (5' 6\")        Objective   Physical Exam  Constitutional:       Appearance: Normal appearance.   HENT:      Nose: Nose normal.   Neck:      Vascular: No carotid bruit.   Cardiovascular:      Rate and Rhythm: Normal rate.      Pulses: Normal pulses.      Heart sounds: Normal heart sounds.   Pulmonary:      Effort: Pulmonary effort is normal.   Abdominal:      General: Bowel sounds are normal.      Palpations: Abdomen is soft.   Musculoskeletal:         General: Normal range of motion.      Cervical back: Normal range of motion.      Right lower leg: No edema.      Left lower leg: No edema.   Skin:     General: Skin is warm and dry.   Neurological:      General: No focal deficit present.      Mental Status: She is alert.   Psychiatric:         Mood and Affect: Mood normal.         Behavior: Behavior normal.         Thought Content: Thought content normal.         Judgment: Judgment normal.         Allergies  Sulfamethoxazole-trimethoprim     Current Medications    Current Outpatient Medications:     acetaminophen (Tylenol) 325 mg tablet, Take 1 tablet (325 mg) by mouth every 6 hours if needed., Disp: , Rfl:     ascorbic acid, vitamin C, 500 mg capsule, Take 2 capsules by mouth once daily., Disp: , Rfl:     aspirin 81 mg EC tablet, Take 1 tablet (81 mg) by mouth 2 times a day., Disp: , Rfl: "     atorvastatin (Lipitor) 10 mg tablet, Take 1 tablet (10 mg) by mouth once daily., Disp: , Rfl:     calcium carbonate-vitamin D3 600 mg-5 mcg (200 unit) tablet, Take 2 tablets by mouth once daily., Disp: , Rfl:     cholecalciferol (Vitamin D-3) 125 MCG (5000 UT) capsule, Take 1 capsule (125 mcg) by mouth once daily., Disp: , Rfl:     cyanocobalamin (Vitamin B-12) 1,000 mcg tablet, Take 1 tablet (1,000 mcg) by mouth once daily., Disp: , Rfl:     dilTIAZem CD (Cardizem CD) 120 mg 24 hr capsule, Take 1 capsule (120 mg) by mouth once daily., Disp: 90 capsule, Rfl: 3    dofetilide (Tikosyn) 125 mcg capsule, Take 3 capsules (375 mcg) by mouth every 12 hours., Disp: 540 capsule, Rfl: 3    famotidine (Pepcid) 10 mg tablet, Take 1 tablet (10 mg) by mouth once daily., Disp: , Rfl:     ferrous sulfate, 325 mg ferrous sulfate, (iron) tablet, Take 1 tablet (325 mg) by mouth 2 times a day., Disp: , Rfl:     folic acid (Folvite) 1 mg tablet, Take 1 tablet (1 mg) by mouth once daily., Disp: , Rfl:     HYDROcodone-acetaminophen (Norco) 5-325 mg tablet, Take 1 tablet by mouth 2 times a day., Disp: , Rfl:     magnesium oxide (Mag-Ox) 400 mg tablet, Take 1 tablet (400 mg) by mouth 2 times a day., Disp: , Rfl:     psyllium (Metamucil) 0.4 gram capsule, Take 1 capsule by mouth early in the morning.., Disp: , Rfl:     sertraline (Zoloft) 50 mg tablet, Take 1 tablet (50 mg) by mouth once daily., Disp: , Rfl:     spironolactone (Aldactone) 50 mg tablet, Take 0.5 tablets (25 mg) by mouth once daily., Disp: , Rfl:                      Assessment/Plan   1. Persistent atrial fibrillation (Multi)  ECG 12 Lead      2. Essential hypertension  Follow Up In Cardiology    Follow Up In Cardiology      3. Ventricular septal defect (HHS-HCC)        4. Mixed hyperlipidemia        5. Sinus bradycardia        6. Hepatic cirrhosis, unspecified hepatic cirrhosis type, unspecified whether ascites present (Multi)        7. Former smoker        8. BMI  25.0-25.9,adult                 Scribe Attestation  By signing my name below, I, Loretta TAYLOR LPN   , Kadeem   attest that this documentation has been prepared under the direction and in the presence of Brad Toth MD.     Provider Attestation - Scribe documentation    All medical record entries made by the Scribe were at my direction and personally dictated by me. I have reviewed the chart and agree that the record accurately reflects my personal performance of the history, physical exam, discussion and plan.

## 2025-03-26 NOTE — LETTER
March 26, 2025     Francesco Aguila MD  [address]  [facility] [id number]    Patient: Billie Hernández   YOB: 1940   Date of Visit: 3/26/2025       Dear Dr. Francesco Aguila MD:    Thank you for referring Billie Hernández to me for evaluation. Below are my notes for this consultation.  If you have questions, please do not hesitate to call me. I look forward to following your patient along with you.       Sincerely,     Brad Toth MD      CC: No Recipients  ______________________________________________________________________________________    Chief Complaint   Patient presents with   • Follow-up     Patient here for 6 month follow up for a-fib; states she was in a-fib 03.15.25, which lasted for about 10-15 minutes.        Subjective   Billie Hernández is a 84 y.o. female     HPI        Patient is here for follow-up to management for persistent atrial fibrillation maintaining sinus rhythm with dofetilide, history of liver cirrhosis and intolerance to anticoagulation and ventricular septal defect.  Since last time I saw her she reports 1 brief episode of atrial fibrillation lasted 15 minutes.  She denies chest pain, lightheadedness, dizziness or syncope.  Her recent lab work noted and reviewed with her.    ASSESSMENT:      1. Persistent atrial fibrillation with prior recurrence following previous left shoulder surgery was placed back on amiodarone at the [facility]. Currently in sinus rhythm. we stopped amiodarone few years back because of concern about liver cirrhosis and liver disease. Sshe had recent recurrence and was placed on dofetilide currently in sinus rhythm.  Patient not candidate for anticoagulation due to liver cirrhosis overall she had good control on her A-fib  2. liver cirrhosis. With esophageal greases and GI bleed  3. Intolerance to anticoagulation due to GI bleed was evaluated recently for watchman device decision was made to defer for the time being Dr. Khan  4. Known  "small ventriculoseptal defect. Change based on recent echo  5.  BMI down to 25  6. Former smoker.  7. Hypertension. Controlled  8.Anemia requiring repeat blood transfusion unable to tolerate anticoagulation. Last check appears to be stable  9. Hyperlipidemia does not meet the criteria for treatment  10. Volume overload due to liver cirrhosis improved back to her baseline. She followed by the renal service     RECOMMENDATION:      1. I advised the patient to continue present medical regimen  2. The chances of recurrence of her atrial fibrillation were discussed.  And treatment option reviewed with her and her daughter  3. I reviewed treatment option and anticoagulation. Patient was evaluated for watchman device and decision was made to defer for now  4.. The complexity of her cardiac status discussed with her at length she understood and agreed  5. I reviewed with her her recent lab work,  6. I will see her back in the office in 6 months with an EKG  Review of Systems   All other systems reviewed and are negative.       EKG done in office today      Vitals:    03/26/25 1443   BP: 134/64   BP Location: Left arm   Patient Position: Sitting   Pulse: 68   Weight: 70.3 kg (155 lb)   Height: 1.676 m (5' 6\")        Objective   Physical Exam  Constitutional:       Appearance: Normal appearance.   HENT:      Nose: Nose normal.   Neck:      Vascular: No carotid bruit.   Cardiovascular:      Rate and Rhythm: Normal rate.      Pulses: Normal pulses.      Heart sounds: Normal heart sounds.   Pulmonary:      Effort: Pulmonary effort is normal.   Abdominal:      General: Bowel sounds are normal.      Palpations: Abdomen is soft.   Musculoskeletal:         General: Normal range of motion.      Cervical back: Normal range of motion.      Right lower leg: No edema.      Left lower leg: No edema.   Skin:     General: Skin is warm and dry.   Neurological:      General: No focal deficit present.      Mental Status: She is alert. "   Psychiatric:         Mood and Affect: Mood normal.         Behavior: Behavior normal.         Thought Content: Thought content normal.         Judgment: Judgment normal.         Allergies  Sulfamethoxazole-trimethoprim     Current Medications    Current Outpatient Medications:   •  acetaminophen (Tylenol) 325 mg tablet, Take 1 tablet (325 mg) by mouth every 6 hours if needed., Disp: , Rfl:   •  ascorbic acid, vitamin C, 500 mg capsule, Take 2 capsules by mouth once daily., Disp: , Rfl:   •  aspirin 81 mg EC tablet, Take 1 tablet (81 mg) by mouth 2 times a day., Disp: , Rfl:   •  atorvastatin (Lipitor) 10 mg tablet, Take 1 tablet (10 mg) by mouth once daily., Disp: , Rfl:   •  calcium carbonate-vitamin D3 600 mg-5 mcg (200 unit) tablet, Take 2 tablets by mouth once daily., Disp: , Rfl:   •  cholecalciferol (Vitamin D-3) 125 MCG (5000 UT) capsule, Take 1 capsule (125 mcg) by mouth once daily., Disp: , Rfl:   •  cyanocobalamin (Vitamin B-12) 1,000 mcg tablet, Take 1 tablet (1,000 mcg) by mouth once daily., Disp: , Rfl:   •  dilTIAZem CD (Cardizem CD) 120 mg 24 hr capsule, Take 1 capsule (120 mg) by mouth once daily., Disp: 90 capsule, Rfl: 3  •  dofetilide (Tikosyn) 125 mcg capsule, Take 3 capsules (375 mcg) by mouth every 12 hours., Disp: 540 capsule, Rfl: 3  •  famotidine (Pepcid) 10 mg tablet, Take 1 tablet (10 mg) by mouth once daily., Disp: , Rfl:   •  ferrous sulfate, 325 mg ferrous sulfate, (iron) tablet, Take 1 tablet (325 mg) by mouth 2 times a day., Disp: , Rfl:   •  folic acid (Folvite) 1 mg tablet, Take 1 tablet (1 mg) by mouth once daily., Disp: , Rfl:   •  HYDROcodone-acetaminophen (Norco) 5-325 mg tablet, Take 1 tablet by mouth 2 times a day., Disp: , Rfl:   •  magnesium oxide (Mag-Ox) 400 mg tablet, Take 1 tablet (400 mg) by mouth 2 times a day., Disp: , Rfl:   •  psyllium (Metamucil) 0.4 gram capsule, Take 1 capsule by mouth early in the morning.., Disp: , Rfl:   •  sertraline (Zoloft) 50 mg tablet,  Take 1 tablet (50 mg) by mouth once daily., Disp: , Rfl:   •  spironolactone (Aldactone) 50 mg tablet, Take 0.5 tablets (25 mg) by mouth once daily., Disp: , Rfl:                      Assessment/Plan   1. Persistent atrial fibrillation (Multi)  ECG 12 Lead      2. Essential hypertension  Follow Up In Cardiology    Follow Up In Cardiology      3. Ventricular septal defect (HHS-HCC)        4. Mixed hyperlipidemia        5. Sinus bradycardia        6. Hepatic cirrhosis, unspecified hepatic cirrhosis type, unspecified whether ascites present (Multi)        7. Former smoker        8. BMI 25.0-25.9,adult                 Scribe Attestation  By signing my name below, ILoretta LPN, Scribe   attest that this documentation has been prepared under the direction and in the presence of Brad Toth MD.     Provider Attestation - Scribe documentation    All medical record entries made by the Scribe were at my direction and personally dictated by me. I have reviewed the chart and agree that the record accurately reflects my personal performance of the history, physical exam, discussion and plan.

## 2025-09-12 ENCOUNTER — APPOINTMENT (OUTPATIENT)
Dept: CARDIOLOGY | Facility: CLINIC | Age: 85
End: 2025-09-12
Payer: MEDICARE